# Patient Record
Sex: FEMALE | Race: OTHER | Employment: UNEMPLOYED | URBAN - NONMETROPOLITAN AREA
[De-identification: names, ages, dates, MRNs, and addresses within clinical notes are randomized per-mention and may not be internally consistent; named-entity substitution may affect disease eponyms.]

---

## 2018-05-19 ENCOUNTER — HOSPITAL ENCOUNTER (EMERGENCY)
Age: 48
Discharge: HOME | End: 2018-05-19
Attending: EMERGENCY MEDICINE
Payer: COMMERCIAL

## 2018-05-19 VITALS
HEART RATE: 87 BPM | SYSTOLIC BLOOD PRESSURE: 136 MMHG | WEIGHT: 160 LBS | TEMPERATURE: 98.9 F | BODY MASS INDEX: 30.21 KG/M2 | OXYGEN SATURATION: 94 % | RESPIRATION RATE: 16 BRPM | HEIGHT: 61 IN | DIASTOLIC BLOOD PRESSURE: 72 MMHG

## 2018-05-19 DIAGNOSIS — S61.412A LACERATION OF SKIN OF HAND, LEFT, INITIAL ENCOUNTER: Primary | ICD-10-CM

## 2018-05-19 PROCEDURE — 99282 EMERGENCY DEPT VISIT SF MDM: CPT

## 2018-05-19 PROCEDURE — 6360000002 HC RX W HCPCS: Performed by: EMERGENCY MEDICINE

## 2018-05-19 PROCEDURE — 90715 TDAP VACCINE 7 YRS/> IM: CPT | Performed by: EMERGENCY MEDICINE

## 2018-05-19 PROCEDURE — 90471 IMMUNIZATION ADMIN: CPT | Performed by: EMERGENCY MEDICINE

## 2018-05-19 PROCEDURE — 12001 RPR S/N/AX/GEN/TRNK 2.5CM/<: CPT

## 2018-05-19 PROCEDURE — 2500000003 HC RX 250 WO HCPCS: Performed by: EMERGENCY MEDICINE

## 2018-05-19 RX ORDER — LIDOCAINE HYDROCHLORIDE 10 MG/ML
5 INJECTION, SOLUTION INFILTRATION; PERINEURAL ONCE
Status: COMPLETED | OUTPATIENT
Start: 2018-05-19 | End: 2018-05-19

## 2018-05-19 RX ADMIN — TETANUS TOXOID, REDUCED DIPHTHERIA TOXOID AND ACELLULAR PERTUSSIS VACCINE, ADSORBED 0.5 ML: 5; 2.5; 8; 8; 2.5 SUSPENSION INTRAMUSCULAR at 18:07

## 2018-05-19 RX ADMIN — LIDOCAINE HYDROCHLORIDE 5 ML: 10 INJECTION, SOLUTION INFILTRATION; PERINEURAL at 18:06

## 2018-05-19 ASSESSMENT — PAIN DESCRIPTION - LOCATION: LOCATION: HAND

## 2018-05-19 ASSESSMENT — PAIN SCALES - GENERAL
PAINLEVEL_OUTOF10: 10
PAINLEVEL_OUTOF10: 10

## 2018-05-19 ASSESSMENT — PAIN DESCRIPTION - DESCRIPTORS: DESCRIPTORS: BURNING

## 2018-05-19 ASSESSMENT — PAIN DESCRIPTION - ORIENTATION: ORIENTATION: LEFT

## 2018-05-19 ASSESSMENT — PAIN DESCRIPTION - FREQUENCY: FREQUENCY: INTERMITTENT

## 2018-05-19 ASSESSMENT — PAIN DESCRIPTION - PAIN TYPE: TYPE: ACUTE PAIN

## 2018-09-05 ENCOUNTER — HOSPITAL ENCOUNTER (EMERGENCY)
Age: 48
Discharge: HOME | End: 2018-09-05
Attending: FAMILY MEDICINE
Payer: COMMERCIAL

## 2018-09-05 VITALS
SYSTOLIC BLOOD PRESSURE: 128 MMHG | HEIGHT: 67 IN | RESPIRATION RATE: 16 BRPM | DIASTOLIC BLOOD PRESSURE: 84 MMHG | OXYGEN SATURATION: 99 % | TEMPERATURE: 97.8 F | BODY MASS INDEX: 21.19 KG/M2 | HEART RATE: 70 BPM | WEIGHT: 135 LBS

## 2018-09-05 DIAGNOSIS — S01.01XA LACERATION OF SCALP, INITIAL ENCOUNTER: Primary | ICD-10-CM

## 2018-09-05 PROCEDURE — 2709999900 HC NON-CHARGEABLE SUPPLY

## 2018-09-05 PROCEDURE — 2500000003 HC RX 250 WO HCPCS: Performed by: FAMILY MEDICINE

## 2018-09-05 PROCEDURE — 90715 TDAP VACCINE 7 YRS/> IM: CPT | Performed by: FAMILY MEDICINE

## 2018-09-05 PROCEDURE — 90471 IMMUNIZATION ADMIN: CPT | Performed by: FAMILY MEDICINE

## 2018-09-05 PROCEDURE — 12001 RPR S/N/AX/GEN/TRNK 2.5CM/<: CPT

## 2018-09-05 PROCEDURE — 99282 EMERGENCY DEPT VISIT SF MDM: CPT

## 2018-09-05 PROCEDURE — 6360000002 HC RX W HCPCS: Performed by: FAMILY MEDICINE

## 2018-09-05 RX ORDER — SERTRALINE HYDROCHLORIDE 100 MG/1
TABLET, FILM COATED ORAL
COMMUNITY
Start: 2018-08-15

## 2018-09-05 RX ORDER — LIDOCAINE HYDROCHLORIDE AND EPINEPHRINE 10; 10 MG/ML; UG/ML
20 INJECTION, SOLUTION INFILTRATION; PERINEURAL ONCE
Status: COMPLETED | OUTPATIENT
Start: 2018-09-05 | End: 2018-09-05

## 2018-09-05 RX ADMIN — TETANUS TOXOID, REDUCED DIPHTHERIA TOXOID AND ACELLULAR PERTUSSIS VACCINE, ADSORBED 0.5 ML: 5; 2.5; 8; 8; 2.5 SUSPENSION INTRAMUSCULAR at 14:46

## 2018-09-05 RX ADMIN — LIDOCAINE HYDROCHLORIDE,EPINEPHRINE BITARTRATE 20 ML: 10; .01 INJECTION, SOLUTION INFILTRATION; PERINEURAL at 14:46

## 2018-09-05 ASSESSMENT — ENCOUNTER SYMPTOMS
RHINORRHEA: 0
ABDOMINAL PAIN: 0
BACK PAIN: 0
SORE THROAT: 0
EYE DISCHARGE: 0
SHORTNESS OF BREATH: 0
VOMITING: 0
EYE PAIN: 0
DIARRHEA: 0
NAUSEA: 0
COUGH: 0
WHEEZING: 0

## 2018-09-05 ASSESSMENT — PAIN SCALES - GENERAL: PAINLEVEL_OUTOF10: 0

## 2019-03-11 ENCOUNTER — HOSPITAL ENCOUNTER (OUTPATIENT)
Age: 49
End: 2019-03-11
Payer: COMMERCIAL

## 2019-03-11 ENCOUNTER — HOSPITAL ENCOUNTER (OUTPATIENT)
Dept: GENERAL RADIOLOGY | Age: 49
Discharge: HOME | End: 2019-03-11
Payer: COMMERCIAL

## 2019-03-11 DIAGNOSIS — R52 PAIN: ICD-10-CM

## 2019-03-11 PROCEDURE — 73560 X-RAY EXAM OF KNEE 1 OR 2: CPT

## 2020-03-24 ENCOUNTER — HOSPITAL ENCOUNTER (EMERGENCY)
Age: 50
Discharge: HOME | End: 2020-03-24
Attending: EMERGENCY MEDICINE
Payer: COMMERCIAL

## 2020-03-24 VITALS
SYSTOLIC BLOOD PRESSURE: 142 MMHG | BODY MASS INDEX: 35.34 KG/M2 | TEMPERATURE: 97.5 F | HEIGHT: 60 IN | RESPIRATION RATE: 17 BRPM | OXYGEN SATURATION: 98 % | DIASTOLIC BLOOD PRESSURE: 88 MMHG | WEIGHT: 180 LBS | HEART RATE: 88 BPM

## 2020-03-24 PROCEDURE — 99283 EMERGENCY DEPT VISIT LOW MDM: CPT

## 2020-03-24 RX ORDER — SULFAMETHOXAZOLE AND TRIMETHOPRIM 800; 160 MG/1; MG/1
1 TABLET ORAL 2 TIMES DAILY
Qty: 14 TABLET | Refills: 0 | Status: SHIPPED | OUTPATIENT
Start: 2020-03-24 | End: 2020-03-31

## 2020-03-24 RX ORDER — CEPHALEXIN 500 MG/1
500 CAPSULE ORAL 4 TIMES DAILY
Qty: 28 CAPSULE | Refills: 0 | Status: SHIPPED | OUTPATIENT
Start: 2020-03-24 | End: 2020-05-21 | Stop reason: ALTCHOICE

## 2020-03-24 RX ORDER — CEPHALEXIN 500 MG/1
500 CAPSULE ORAL ONCE
Status: DISCONTINUED | OUTPATIENT
Start: 2020-03-24 | End: 2020-03-24 | Stop reason: HOSPADM

## 2020-03-24 RX ORDER — MUPIROCIN CALCIUM 20 MG/G
CREAM TOPICAL
Qty: 1 TUBE | Refills: 0 | Status: SHIPPED | OUTPATIENT
Start: 2020-03-24 | End: 2020-04-23

## 2020-03-24 ASSESSMENT — PAIN SCALES - GENERAL
PAINLEVEL_OUTOF10: 10
PAINLEVEL_OUTOF10: 10

## 2020-03-24 ASSESSMENT — PAIN DESCRIPTION - LOCATION: LOCATION: BREAST

## 2020-03-24 ASSESSMENT — PAIN DESCRIPTION - ORIENTATION: ORIENTATION: RIGHT

## 2020-03-25 NOTE — ED PROVIDER NOTES
[address]  [address] [place] [address] [address]  Phone: [phone]    Emergency Department encounter      [address] [facility]    Chief Complaint   Patient presents with    Abscess     under right breast       HPI    Parish Zhao is a 52 y.o. female who presents noted complaint. Patient presents with abscess. She notes a spot underneath her right breast.  She has had prior abscesses in the past.  No other symptoms at this time such as fever or other problems. PAST MEDICAL HISTORY    Past Medical History:   Diagnosis Date    Depressive disorder, not elsewhere classified 4/22/2013       SURGICAL HISTORY    History reviewed. No pertinent surgical history. CURRENT MEDICATIONS    Current Outpatient Rx   Medication Sig Dispense Refill    cephALEXin (KEFLEX) 500 MG capsule Take 1 capsule by mouth 4 times daily 28 capsule 0    sulfamethoxazole-trimethoprim (BACTRIM DS) 800-160 MG per tablet Take 1 tablet by mouth 2 times daily for 7 days 14 tablet 0    mupirocin (BACTROBAN) 2 % cream Apply topically 3 times daily.  1 Tube 0    sertraline (ZOLOFT) 100 MG tablet          ALLERGIES    No Known Allergies    FAMILY HISTORY    Family History   Problem Relation Age of Onset    Cancer Father        SOCIAL HISTORY    Social History     Socioeconomic History    Marital status: Single     Spouse name: None    Number of children: 2    Years of education: 7    Highest education level: None   Occupational History    Occupation: unemployed   Social Needs    Financial resource strain: None    Food insecurity     Worry: None     Inability: None    Transportation needs     Medical: None     Non-medical: None   Tobacco Use    Smoking status: Current Every Day Smoker     Packs/day: 1.00     Types: Cigarettes    Smokeless tobacco: Never Used   Substance and Sexual Activity    Alcohol use: No     Alcohol/week: 2.0 standard drinks     Types: 2 Cans of beer per week    Drug use: No    Sexual further evaluation as an outpatient. Following up with primary care. Keflex will be gone here. Bactrim and Keflex and Bactroban as outpatient    SCREENINGS  BP (!) 142/88   Pulse 88   Temp 97.5 °F (36.4 °C)   Resp 17   Ht 5' (1.524 m)   Wt 180 lb (81.6 kg)   SpO2 98%   BMI 35.15 kg/m²      No orders to display       Screening For Hypertension and Follow-up (#317)  patient informed that blood pressure is abnormal and in the pre-hypertension range and should be rechecked by primary care      Screening For Tobacco Use and Cessation Intervention (#226):   reports that she has been smoking cigarettes. She has been smoking about 1.00 pack per day. She has never used smokeless tobacco.  Tobacco cessation encouraged with brief counseling. Written home care instructions for smoking cessation provided. FINAL IMPRESSION    1. Abscess    2. Nicotine abuse         PATIENT REFERRED TO:  Ludivina De Paz MD  [address] [place] [address]  [phone]    Call   For evaluation      DISCHARGE MEDICATIONS:  New Prescriptions    CEPHALEXIN (KEFLEX) 500 MG CAPSULE    Take 1 capsule by mouth 4 times daily    MUPIROCIN (BACTROBAN) 2 % CREAM    Apply topically 3 times daily.     SULFAMETHOXAZOLE-TRIMETHOPRIM (BACTRIM DS) 800-160 MG PER TABLET    Take 1 tablet by mouth 2 times daily for 7 days          Griselda Saha, MD  03/24/20 6470

## 2020-03-25 NOTE — ED NOTES
Discharge teaching and instructions for condition explained to patient. AVS reviewed. Printed prescriptions given to patient. Patient voiced understanding regarding prescriptions, follow up appointments and care of self at home. Pt discharged to home in stable condition per self.        Aurelio Leon, RN  03/24/20 6752

## 2020-05-21 ENCOUNTER — HOSPITAL ENCOUNTER (EMERGENCY)
Age: 50
Discharge: HOME | End: 2020-05-21
Attending: EMERGENCY MEDICINE
Payer: COMMERCIAL

## 2020-05-21 ENCOUNTER — APPOINTMENT (OUTPATIENT)
Dept: GENERAL RADIOLOGY | Age: 50
End: 2020-05-21
Payer: COMMERCIAL

## 2020-05-21 VITALS
OXYGEN SATURATION: 98 % | HEIGHT: 64 IN | SYSTOLIC BLOOD PRESSURE: 131 MMHG | HEART RATE: 100 BPM | WEIGHT: 180 LBS | RESPIRATION RATE: 18 BRPM | DIASTOLIC BLOOD PRESSURE: 72 MMHG | BODY MASS INDEX: 30.73 KG/M2 | TEMPERATURE: 97.5 F

## 2020-05-21 PROCEDURE — 6370000000 HC RX 637 (ALT 250 FOR IP): Performed by: EMERGENCY MEDICINE

## 2020-05-21 PROCEDURE — 99283 EMERGENCY DEPT VISIT LOW MDM: CPT

## 2020-05-21 PROCEDURE — 73564 X-RAY EXAM KNEE 4 OR MORE: CPT

## 2020-05-21 PROCEDURE — L1830 KO IMMOB CANVAS LONG PRE OTS: HCPCS

## 2020-05-21 RX ORDER — IBUPROFEN 200 MG
600 TABLET ORAL ONCE
Status: COMPLETED | OUTPATIENT
Start: 2020-05-21 | End: 2020-05-21

## 2020-05-21 RX ORDER — ACETAMINOPHEN 325 MG/1
650 TABLET ORAL EVERY 6 HOURS PRN
COMMUNITY

## 2020-05-21 RX ADMIN — IBUPROFEN 600 MG: 200 TABLET, FILM COATED ORAL at 22:10

## 2020-05-21 ASSESSMENT — PAIN SCALES - GENERAL
PAINLEVEL_OUTOF10: 10
PAINLEVEL_OUTOF10: 10

## 2020-05-22 NOTE — ED PROVIDER NOTES
[address]  [address] [place] [address] [address]  Phone: [phone]    Emergency Department encounter      279 [facility]    Chief Complaint   Patient presents with    Knee Pain     right       HPI    Jim Cramer is a 52 y.o. female who presents above-noted complaint. Patient states she fell down 2 steps hitting her right knee. Landing on her right knee. Complains of pain and discomfort to that area. Denies syncope spinal injury back pain chest pain extremity pain other than her knee and is otherwise been doing fine.     PAST MEDICAL HISTORY    Past Medical History:   Diagnosis Date    Depressive disorder, not elsewhere classified 4/22/2013       SURGICAL HISTORY    Past Surgical History:   Procedure Laterality Date    FOOT SURGERY Left     KNEE SURGERY Right        CURRENT MEDICATIONS    Current Outpatient Rx   Medication Sig Dispense Refill    acetaminophen (TYLENOL) 325 MG tablet Take 650 mg by mouth every 6 hours as needed for Pain      sertraline (ZOLOFT) 100 MG tablet          ALLERGIES    No Known Allergies    FAMILY HISTORY    Family History   Problem Relation Age of Onset    Cancer Father        SOCIAL HISTORY    Social History     Socioeconomic History    Marital status: Single     Spouse name: None    Number of children: 2    Years of education: 7    Highest education level: None   Occupational History    Occupation: unemployed   Social Needs    Financial resource strain: None    Food insecurity     Worry: None     Inability: None    Transportation needs     Medical: None     Non-medical: None   Tobacco Use    Smoking status: Current Every Day Smoker     Packs/day: 1.00     Types: Cigarettes    Smokeless tobacco: Never Used   Substance and Sexual Activity    Alcohol use: No     Alcohol/week: 2.0 standard drinks     Types: 2 Cans of beer per week    Drug use: No    Sexual activity: Not Currently   Lifestyle    Physical activity     Days per week: None Minutes per session: None    Stress: None   Relationships    Social connections     Talks on phone: None     Gets together: None     Attends [employer] service: None     Active member of club or organization: None     Attends meetings of clubs or organizations: None     Relationship status: None    Intimate partner violence     Fear of current or ex partner: None     Emotionally abused: None     Physically abused: None     Forced sexual activity: None   Other Topics Concern    None   Social History Narrative    Lives with 23 y/o daughter. Has a 15 y/o that lives with her niece. REVIEW OF SYSTEMS    As for knee injury. No weakness numbness or tingling. No other trauma or injury. All systems negative except as marked. PHYSICAL EXAM    VITAL SIGNS: /72   Pulse 100   Temp 97.5 °F (36.4 °C) (Oral)   Resp 18   Ht 5' 4\" (1.626 m)   Wt 180 lb (81.6 kg)   SpO2 98%   BMI 30.90 kg/m²    Constitutional:  Alert not toxtic or ill, able to give coherent history*  HENT:  Normocephalic, Atraumatic  Cervical Spine: Normal range of motion,  No stridor. Eyes:  No discharge or  Swelling  Respiratory: No respiratory distress  Musculoskeletal:  Intact distal pulses,   Pain at old scar to the right medial knee. Maybe a small bruise to the anterior patella area without step-off. No crepitus. Drawer signs are negative. Integument:  Warm, Dry, No erythema, No rash (on exposed areas)   Neurologic:  Alert & appropriate   Psychiatric:  Affect normal    EKG                      RADIOLOGY    XR KNEE RIGHT (MIN 4 VIEWS)   Final Result    Normal knee. **This report has been created using voice recognition software. It may contain minor errors which are inherent in voice recognition technology. **      Final report electronically signed by Dr. Marc Marrero on 5/21/2020 10:29 PM          PROCEDURES    none      CONSULTS:  None        ED COURSE & MEDICAL DECISION MAKING    Pertinent Labs & Imaging studies reviewed. (See chart for details)  She has right knee pain and discomfort after fall. Neurovascular exam is otherwise normal.  No other spinal injury or other trauma or other problems. Checking plain film to rule out fracture or other bony injury. REASSESSMENT  Patient rechecked and updated on lab/xray status, progress and results. .  Patient was reassessed and condition was unchanged after tx. No further needs at this time. Is a negative at this time. Recommend a Velcro splint for comfort    SCREENINGS  /72   Pulse 100   Temp 97.5 °F (36.4 °C) (Oral)   Resp 18   Ht 5' 4\" (1.626 m)   Wt 180 lb (81.6 kg)   SpO2 98%   BMI 30.90 kg/m²      XR KNEE RIGHT (MIN 4 VIEWS)   Final Result    Normal knee. **This report has been created using voice recognition software. It may contain minor errors which are inherent in voice recognition technology. **      Final report electronically signed by Dr. Zainab Walker on 5/21/2020 10:29 PM          Screening For Hypertension and Follow-up (#317)  patient informed that blood pressure is abnormal and in the pre-hypertension range and should be rechecked by primary care      Screening For Tobacco Use and Cessation Intervention (#226):   reports that she has been smoking cigarettes. She has been smoking about 1.00 pack per day. She has never used smokeless tobacco.  Tobacco cessation encouraged with brief counseling. Written home care instructions for smoking cessation provided. FINAL IMPRESSION    1. Contusion of right knee, initial encounter    2.  Nicotine abuse         PATIENT REFERRED TO:  Roxana Velasquez MD  [address] [place] [address]  [phone]    Call   For evaluation      DISCHARGE MEDICATIONS:  New Prescriptions    No medications on file           Lizzie Zavaleta MD  05/22/20 3380

## 2020-05-22 NOTE — ED NOTES
Discharged home in stable condition. AVS discussed/reviewed with patient. Patient verbalized understanding of all instructions given; no questions/concerns voiced. Respirations easy, regular and non-labored; no distress noted. Skin color normal for ethnicity, warm and dry; mucous membranes moist. Alert and appropriate for age. Transported via wheelchair to private vehicle.      Ben Ham RN  05/21/20 6138

## 2020-05-22 NOTE — ED TRIAGE NOTES
Patient presents per private vehicle. Wheelchair taken outside to assist patient into the building. Patient c/o right knee pain s/p falling down five steps. States she was carrying her laundry basket and must have missed a step. Denies hitting her head or any loss of consciousness. Right knee noted to be slightly swollen and ecchymotic. States she did ice it when it happened. States this happened about 2100. Also reports that she took Tylenol 325 mg x 4 tablets. Patient given proper dosing for Tylenol. Right pedal pulse strong to palpation. Respirations easy, regular and non-labored; no distress noted. Skin color normal for ethnicity, warm and dry; mucous membranes moist. Alert and appropriate for age. Taken by wheelchair to exam room 5 for triage. Patient remains in wheelchair. Dr. Jessica Mathew made aware of patient.

## 2020-06-13 ENCOUNTER — HOSPITAL ENCOUNTER (OUTPATIENT)
Age: 50
Setting detail: OBSERVATION
Discharge: LEFT BEFORE DISCHARGE | End: 2020-06-14
Attending: FAMILY MEDICINE | Admitting: FAMILY MEDICINE
Payer: COMMERCIAL

## 2020-06-13 PROBLEM — R07.9 CHEST PAIN: Status: ACTIVE | Noted: 2020-06-13

## 2020-06-13 PROCEDURE — G0378 HOSPITAL OBSERVATION PER HR: HCPCS

## 2020-06-13 PROCEDURE — G0379 DIRECT REFER HOSPITAL OBSERV: HCPCS

## 2020-06-13 PROCEDURE — 99219 PR INITIAL OBSERVATION CARE/DAY 50 MINUTES: CPT | Performed by: FAMILY MEDICINE

## 2020-06-13 RX ORDER — ACETAMINOPHEN 325 MG/1
650 TABLET ORAL EVERY 6 HOURS PRN
Status: DISCONTINUED | OUTPATIENT
Start: 2020-06-13 | End: 2020-06-14 | Stop reason: HOSPADM

## 2020-06-13 RX ORDER — PROMETHAZINE HYDROCHLORIDE 25 MG/1
12.5 TABLET ORAL EVERY 6 HOURS PRN
Status: DISCONTINUED | OUTPATIENT
Start: 2020-06-13 | End: 2020-06-14 | Stop reason: HOSPADM

## 2020-06-13 RX ORDER — ASPIRIN 81 MG/1
81 TABLET, CHEWABLE ORAL DAILY
Status: DISCONTINUED | OUTPATIENT
Start: 2020-06-14 | End: 2020-06-14

## 2020-06-13 RX ORDER — HEPARIN SODIUM 5000 [USP'U]/ML
5000 INJECTION, SOLUTION INTRAVENOUS; SUBCUTANEOUS EVERY 8 HOURS SCHEDULED
Status: DISCONTINUED | OUTPATIENT
Start: 2020-06-14 | End: 2020-06-14 | Stop reason: HOSPADM

## 2020-06-13 RX ORDER — ONDANSETRON 2 MG/ML
4 INJECTION INTRAMUSCULAR; INTRAVENOUS EVERY 6 HOURS PRN
Status: DISCONTINUED | OUTPATIENT
Start: 2020-06-13 | End: 2020-06-14 | Stop reason: HOSPADM

## 2020-06-13 RX ORDER — SODIUM CHLORIDE 0.9 % (FLUSH) 0.9 %
10 SYRINGE (ML) INJECTION EVERY 12 HOURS SCHEDULED
Status: DISCONTINUED | OUTPATIENT
Start: 2020-06-14 | End: 2020-06-14 | Stop reason: HOSPADM

## 2020-06-13 RX ORDER — SODIUM CHLORIDE 0.9 % (FLUSH) 0.9 %
10 SYRINGE (ML) INJECTION PRN
Status: DISCONTINUED | OUTPATIENT
Start: 2020-06-13 | End: 2020-06-14 | Stop reason: HOSPADM

## 2020-06-13 RX ORDER — ACETAMINOPHEN 650 MG/1
650 SUPPOSITORY RECTAL EVERY 6 HOURS PRN
Status: DISCONTINUED | OUTPATIENT
Start: 2020-06-13 | End: 2020-06-14 | Stop reason: HOSPADM

## 2020-06-13 RX ORDER — POLYETHYLENE GLYCOL 3350 17 G/17G
17 POWDER, FOR SOLUTION ORAL DAILY PRN
Status: DISCONTINUED | OUTPATIENT
Start: 2020-06-13 | End: 2020-06-14 | Stop reason: HOSPADM

## 2020-06-13 RX ORDER — SERTRALINE HYDROCHLORIDE 100 MG/1
100 TABLET, FILM COATED ORAL DAILY
Status: DISCONTINUED | OUTPATIENT
Start: 2020-06-14 | End: 2020-06-14 | Stop reason: HOSPADM

## 2020-06-13 RX ORDER — NITROGLYCERIN 0.4 MG/1
0.4 TABLET SUBLINGUAL EVERY 5 MIN PRN
Status: DISCONTINUED | OUTPATIENT
Start: 2020-06-13 | End: 2020-06-14 | Stop reason: HOSPADM

## 2020-06-13 ASSESSMENT — PAIN SCALES - GENERAL: PAINLEVEL_OUTOF10: 0

## 2020-06-14 VITALS
WEIGHT: 182.3 LBS | DIASTOLIC BLOOD PRESSURE: 77 MMHG | HEART RATE: 76 BPM | BODY MASS INDEX: 32.3 KG/M2 | HEIGHT: 63 IN | SYSTOLIC BLOOD PRESSURE: 124 MMHG | TEMPERATURE: 97.8 F | OXYGEN SATURATION: 95 % | RESPIRATION RATE: 18 BRPM

## 2020-06-14 LAB
ALBUMIN SERPL-MCNC: 3.4 G/DL (ref 3.5–5.1)
ALP BLD-CCNC: 56 U/L (ref 38–126)
ALT SERPL-CCNC: 16 U/L (ref 11–66)
ANION GAP SERPL CALCULATED.3IONS-SCNC: 10 MEQ/L (ref 8–16)
APTT: 33.4 SECONDS (ref 22–38)
AST SERPL-CCNC: 16 U/L (ref 5–40)
AVERAGE GLUCOSE: 96 MG/DL (ref 70–126)
BASOPHILS # BLD: 0.4 %
BASOPHILS ABSOLUTE: 0 THOU/MM3 (ref 0–0.1)
BILIRUB SERPL-MCNC: 0.3 MG/DL (ref 0.3–1.2)
BUN BLDV-MCNC: 6 MG/DL (ref 7–22)
CALCIUM SERPL-MCNC: 9.3 MG/DL (ref 8.5–10.5)
CHLORIDE BLD-SCNC: 100 MEQ/L (ref 98–111)
CHOLESTEROL, TOTAL: 177 MG/DL (ref 100–199)
CO2: 25 MEQ/L (ref 23–33)
CREAT SERPL-MCNC: 0.5 MG/DL (ref 0.4–1.2)
EKG ATRIAL RATE: 83 BPM
EKG P AXIS: 51 DEGREES
EKG P-R INTERVAL: 164 MS
EKG Q-T INTERVAL: 374 MS
EKG QRS DURATION: 80 MS
EKG QTC CALCULATION (BAZETT): 439 MS
EKG R AXIS: 36 DEGREES
EKG T AXIS: 34 DEGREES
EKG VENTRICULAR RATE: 83 BPM
EOSINOPHIL # BLD: 4.4 %
EOSINOPHILS ABSOLUTE: 0.4 THOU/MM3 (ref 0–0.4)
ERYTHROCYTE [DISTWIDTH] IN BLOOD BY AUTOMATED COUNT: 13.2 % (ref 11.5–14.5)
ERYTHROCYTE [DISTWIDTH] IN BLOOD BY AUTOMATED COUNT: 42.9 FL (ref 35–45)
GFR SERPL CREATININE-BSD FRML MDRD: > 90 ML/MIN/1.73M2
GLUCOSE BLD-MCNC: 103 MG/DL (ref 70–108)
HBA1C MFR BLD: 5.2 % (ref 4.4–6.4)
HCT VFR BLD CALC: 42.6 % (ref 37–47)
HDLC SERPL-MCNC: 41 MG/DL
HEMOGLOBIN: 14.2 GM/DL (ref 12–16)
IMMATURE GRANS (ABS): 0.02 THOU/MM3 (ref 0–0.07)
IMMATURE GRANULOCYTES: 0.2 %
INR BLD: 0.99 (ref 0.85–1.13)
LDL CHOLESTEROL CALCULATED: 114 MG/DL
LYMPHOCYTES # BLD: 35.5 %
LYMPHOCYTES ABSOLUTE: 3 THOU/MM3 (ref 1–4.8)
MAGNESIUM: 1.8 MG/DL (ref 1.6–2.4)
MCH RBC QN AUTO: 29.6 PG (ref 26–33)
MCHC RBC AUTO-ENTMCNC: 33.3 GM/DL (ref 32.2–35.5)
MCV RBC AUTO: 88.9 FL (ref 81–99)
MONOCYTES # BLD: 7.5 %
MONOCYTES ABSOLUTE: 0.6 THOU/MM3 (ref 0.4–1.3)
NUCLEATED RED BLOOD CELLS: 0 /100 WBC
PHOSPHORUS: 4.1 MG/DL (ref 2.4–4.7)
PLATELET # BLD: 211 THOU/MM3 (ref 130–400)
PMV BLD AUTO: 9.1 FL (ref 9.4–12.4)
POTASSIUM REFLEX MAGNESIUM: 3.7 MEQ/L (ref 3.5–5.2)
RBC # BLD: 4.79 MILL/MM3 (ref 4.2–5.4)
SEG NEUTROPHILS: 52 %
SEGMENTED NEUTROPHILS ABSOLUTE COUNT: 4.4 THOU/MM3 (ref 1.8–7.7)
SODIUM BLD-SCNC: 135 MEQ/L (ref 135–145)
TOTAL PROTEIN: 7 G/DL (ref 6.1–8)
TRIGL SERPL-MCNC: 112 MG/DL (ref 0–199)
TROPONIN T: < 0.01 NG/ML
TROPONIN T: < 0.01 NG/ML
TSH SERPL DL<=0.05 MIU/L-ACNC: 4.18 UIU/ML (ref 0.4–4.2)
WBC # BLD: 8.5 THOU/MM3 (ref 4.8–10.8)

## 2020-06-14 PROCEDURE — 6370000000 HC RX 637 (ALT 250 FOR IP): Performed by: FAMILY MEDICINE

## 2020-06-14 PROCEDURE — 85025 COMPLETE CBC W/AUTO DIFF WBC: CPT

## 2020-06-14 PROCEDURE — 85610 PROTHROMBIN TIME: CPT

## 2020-06-14 PROCEDURE — 6360000002 HC RX W HCPCS: Performed by: FAMILY MEDICINE

## 2020-06-14 PROCEDURE — 84443 ASSAY THYROID STIM HORMONE: CPT

## 2020-06-14 PROCEDURE — 2580000003 HC RX 258: Performed by: INTERNAL MEDICINE

## 2020-06-14 PROCEDURE — 93005 ELECTROCARDIOGRAM TRACING: CPT | Performed by: FAMILY MEDICINE

## 2020-06-14 PROCEDURE — 84484 ASSAY OF TROPONIN QUANT: CPT

## 2020-06-14 PROCEDURE — 96372 THER/PROPH/DIAG INJ SC/IM: CPT

## 2020-06-14 PROCEDURE — 85730 THROMBOPLASTIN TIME PARTIAL: CPT

## 2020-06-14 PROCEDURE — 36415 COLL VENOUS BLD VENIPUNCTURE: CPT

## 2020-06-14 PROCEDURE — 83735 ASSAY OF MAGNESIUM: CPT

## 2020-06-14 PROCEDURE — 99217 PR OBSERVATION CARE DISCHARGE MANAGEMENT: CPT | Performed by: INTERNAL MEDICINE

## 2020-06-14 PROCEDURE — G0378 HOSPITAL OBSERVATION PER HR: HCPCS

## 2020-06-14 PROCEDURE — 80061 LIPID PANEL: CPT

## 2020-06-14 PROCEDURE — 2580000003 HC RX 258: Performed by: FAMILY MEDICINE

## 2020-06-14 PROCEDURE — 83036 HEMOGLOBIN GLYCOSYLATED A1C: CPT

## 2020-06-14 PROCEDURE — 94760 N-INVAS EAR/PLS OXIMETRY 1: CPT

## 2020-06-14 PROCEDURE — 80053 COMPREHEN METABOLIC PANEL: CPT

## 2020-06-14 PROCEDURE — 84100 ASSAY OF PHOSPHORUS: CPT

## 2020-06-14 RX ORDER — ATORVASTATIN CALCIUM 40 MG/1
40 TABLET, FILM COATED ORAL NIGHTLY
Status: DISCONTINUED | OUTPATIENT
Start: 2020-06-14 | End: 2020-06-14 | Stop reason: HOSPADM

## 2020-06-14 RX ORDER — SODIUM CHLORIDE 9 MG/ML
INJECTION, SOLUTION INTRAVENOUS CONTINUOUS
Status: DISCONTINUED | OUTPATIENT
Start: 2020-06-14 | End: 2020-06-14 | Stop reason: HOSPADM

## 2020-06-14 RX ORDER — ASPIRIN 81 MG/1
81 TABLET, CHEWABLE ORAL DAILY
Status: DISCONTINUED | OUTPATIENT
Start: 2020-06-15 | End: 2020-06-14 | Stop reason: HOSPADM

## 2020-06-14 RX ORDER — ASPIRIN 81 MG/1
324 TABLET, CHEWABLE ORAL ONCE
Status: COMPLETED | OUTPATIENT
Start: 2020-06-14 | End: 2020-06-14

## 2020-06-14 RX ORDER — ASPIRIN 81 MG/1
81 TABLET, CHEWABLE ORAL DAILY
Qty: 30 TABLET | Refills: 0 | Status: SHIPPED | OUTPATIENT
Start: 2020-06-15

## 2020-06-14 RX ORDER — ATORVASTATIN CALCIUM 40 MG/1
40 TABLET, FILM COATED ORAL NIGHTLY
Qty: 30 TABLET | Refills: 0 | Status: SHIPPED | OUTPATIENT
Start: 2020-06-14

## 2020-06-14 RX ADMIN — SERTRALINE 100 MG: 100 TABLET, FILM COATED ORAL at 10:26

## 2020-06-14 RX ADMIN — ASPIRIN 81 MG 324 MG: 81 TABLET ORAL at 10:25

## 2020-06-14 RX ADMIN — SODIUM CHLORIDE: 9 INJECTION, SOLUTION INTRAVENOUS at 10:25

## 2020-06-14 RX ADMIN — Medication 10 ML: at 10:26

## 2020-06-14 RX ADMIN — HEPARIN SODIUM 5000 UNITS: 5000 INJECTION INTRAVENOUS; SUBCUTANEOUS at 06:31

## 2020-06-14 ASSESSMENT — PAIN SCALES - GENERAL
PAINLEVEL_OUTOF10: 0
PAINLEVEL_OUTOF10: 0

## 2020-06-14 NOTE — PROGRESS NOTES
Pt signed out AMA, Educated on discharge instructions, medications, and follow up appointments. Discharge education provided in [address] and [address] No further questions or concerns. Discharged to home with children.

## 2020-06-14 NOTE — DISCHARGE SUMMARY
1015   BP:  125/65  124/77   Pulse:  79  76   Resp:  18  18   Temp:  97.6 °F (36.4 °C)  97.8 °F (36.6 °C)   TempSrc:  Oral  Oral   SpO2:  96% 95% 95%   Weight: 182 lb 4.8 oz (82.7 kg)      Height: 5' 3\" (1.6 m)        Weight: Weight: 182 lb 4.8 oz (82.7 kg)     24 hour intake/output:No intake or output data in the 24 hours ending 06/14/20 1752      General appearance: No apparent distress, well developed, appears stated age. Eyes:  Pupils equal, round, and reactive to light. Conjunctivae/corneas clear. HENT: Head normal in appearance. External nares normal.  Oral mucosa moist without lesions. Hearing grossly intact. Neck: Supple, with full range of motion. No jugular venous distention. Trachea midline. Respiratory:  Normal respiratory effort. Clear to auscultation, bilaterally without rales or wheezes or rhonchi. Cardiovascular: Normal rate, regular rhythm with normal S1/S2 without murmurs. No lower extremity edema. Abdomen: Soft, non-tender, non-distended with normal bowel sounds. Musculoskeletal: Normal range of motion in extremities. There is no joint swelling or tenderness. Skin: Skin color, texture, turgor normal.  No rashes or lesions. Neurologic:  Neurovascularly intact without any focal sensory/motor deficits in the upper and lower extremities. Cranial nerves:  grossly non-focal.  Psychiatric: Alert and oriented, thought content appropriate, normal insight. Labs:  For convenience and continuity at follow-up the following most recent labs are provided:      CBC:    Lab Results   Component Value Date    WBC 8.5 06/14/2020    HGB 14.2 06/14/2020    HCT 42.6 06/14/2020     06/14/2020       Renal:    Lab Results   Component Value Date     06/14/2020    K 3.7 06/14/2020     06/14/2020    CO2 25 06/14/2020    BUN 6 06/14/2020    CREATININE 0.5 06/14/2020    CALCIUM 9.3 06/14/2020    PHOS 4.1 06/14/2020         Significant Diagnostic Studies    Radiology:   No orders to display

## 2020-06-14 NOTE — PROGRESS NOTES
Patient is a 48year old from [facility] with Dr Coleen Berry transferring. Patient presented with c/o sub-sternal chest pain for months but worsening today with associated nausea, vomiting and SOB. Patient without cardiac history but is a smoker. Patient is Barb[place] speaking. Chest pain resolved with ntg. Troponin negative x 1. EKG NSR without ST changes. /85, HR 69, SPO2 98% room air. Afebrile. CXR normal. D-Dimer WNL. No gtts started. Patient will go to [phone] as an observation CP under Dr Julio Alexis.

## 2020-06-14 NOTE — H&P
of the patient's chest without radiation / no associated nausea/emesis or shortness of breath (all reported at [facility]) / no diaphoresis or paresthesias / chest pain remained constant following initial onset, but did worsen with exertion and improve slightly with rest. Patient denies experiencing chest pain like this in the past. She is a chronic PPD smoker. Denies personal history of CAD/MI, HTN, HLD or DM. Denies family history of early CAD/MI. She takes zoloft daily for hx depression, but does not take any other daily medications. Of note -- she did receive nitro at [facility], which resolved her chest pain completely. Denies recent illness or sick contacts. No reported fevers/chills, headaches, cough, wheezing, abdominal pain, diarrhea/constipation, urinary symptoms, leg swelling or syncope. [place] ED course: afebrile / VSS on arrival (/85 / HR 69 / SpO2 98% on RA). Trop negative x 1. EKG: NSR without ST changes. D-Dimer nml. Symptoms relieved following nitro administration. No additional therapies started. Patient was transferred to [facility] for additional workup and management. She remained hemodynamically stable and asymptomatic of CP upon arrival to [facility]. No additional questions / concerns identified at this time. Please see A/P for additional details. Past Medical History:    Chronic tobacco abuse  Obesity  Depression    Past Surgical History:    Procedure Laterality Date    FOOT SURGERY Left     KNEE SURGERY Right       Medications Prior to Admission:   Medication Sig    acetaminophen (TYLENOL) 325 MG tablet Take 650 mg by mouth every 6 hours as needed for Pain    sertraline (ZOLOFT) 100 MG tablet      Allergies:   Patient has no known allergies.     Social History:   Socioeconomic History    Marital status: Single    Number of children: 2   Tobacco Use    Smoking status: Current Every Day Smoker     Packs/day: 1.00     Types: Cigarettes    Smokeless tobacco: Never Used

## 2020-06-15 PROCEDURE — 93010 ELECTROCARDIOGRAM REPORT: CPT | Performed by: INTERNAL MEDICINE

## 2022-05-27 ENCOUNTER — HOSPITAL ENCOUNTER (EMERGENCY)
Age: 52
Discharge: HOME | End: 2022-05-27
Attending: EMERGENCY MEDICINE
Payer: COMMERCIAL

## 2022-05-27 VITALS
HEIGHT: 61 IN | WEIGHT: 180 LBS | HEART RATE: 87 BPM | TEMPERATURE: 97.1 F | RESPIRATION RATE: 16 BRPM | DIASTOLIC BLOOD PRESSURE: 74 MMHG | BODY MASS INDEX: 33.99 KG/M2 | OXYGEN SATURATION: 99 % | SYSTOLIC BLOOD PRESSURE: 131 MMHG

## 2022-05-27 DIAGNOSIS — M54.10 RADICULAR PAIN OF LEFT LOWER EXTREMITY: Primary | ICD-10-CM

## 2022-05-27 DIAGNOSIS — M54.32 SCIATICA, LEFT SIDE: ICD-10-CM

## 2022-05-27 PROCEDURE — 96372 THER/PROPH/DIAG INJ SC/IM: CPT

## 2022-05-27 PROCEDURE — 6370000000 HC RX 637 (ALT 250 FOR IP): Performed by: EMERGENCY MEDICINE

## 2022-05-27 PROCEDURE — 6360000002 HC RX W HCPCS: Performed by: EMERGENCY MEDICINE

## 2022-05-27 PROCEDURE — 99284 EMERGENCY DEPT VISIT MOD MDM: CPT

## 2022-05-27 RX ORDER — IBUPROFEN 800 MG/1
800 TABLET ORAL ONCE
Status: COMPLETED | OUTPATIENT
Start: 2022-05-27 | End: 2022-05-27

## 2022-05-27 RX ORDER — KETOROLAC TROMETHAMINE 30 MG/ML
30 INJECTION, SOLUTION INTRAMUSCULAR; INTRAVENOUS ONCE
Status: COMPLETED | OUTPATIENT
Start: 2022-05-27 | End: 2022-05-27

## 2022-05-27 RX ADMIN — HYDROMORPHONE HYDROCHLORIDE 0.5 MG: 1 INJECTION, SOLUTION INTRAMUSCULAR; INTRAVENOUS; SUBCUTANEOUS at 19:44

## 2022-05-27 RX ADMIN — IBUPROFEN 800 MG: 800 TABLET, FILM COATED ORAL at 19:45

## 2022-05-27 RX ADMIN — KETOROLAC TROMETHAMINE 30 MG: 30 INJECTION, SOLUTION INTRAMUSCULAR at 19:42

## 2022-05-27 ASSESSMENT — ENCOUNTER SYMPTOMS
WHEEZING: 0
DIARRHEA: 0
EYE PAIN: 0
ABDOMINAL PAIN: 0
SHORTNESS OF BREATH: 0
BLOOD IN STOOL: 0
BACK PAIN: 0
SORE THROAT: 0
EYE DISCHARGE: 0

## 2022-05-27 ASSESSMENT — PAIN - FUNCTIONAL ASSESSMENT
PAIN_FUNCTIONAL_ASSESSMENT: NONE - DENIES PAIN
PAIN_FUNCTIONAL_ASSESSMENT: 0-10

## 2022-05-27 ASSESSMENT — PAIN DESCRIPTION - ORIENTATION: ORIENTATION: LEFT

## 2022-05-27 ASSESSMENT — PAIN DESCRIPTION - FREQUENCY: FREQUENCY: INTERMITTENT

## 2022-05-27 ASSESSMENT — PAIN DESCRIPTION - LOCATION: LOCATION: LEG

## 2022-05-27 ASSESSMENT — PAIN DESCRIPTION - PAIN TYPE: TYPE: ACUTE PAIN

## 2022-05-27 ASSESSMENT — PAIN DESCRIPTION - DESCRIPTORS: DESCRIPTORS: BURNING;CRAMPING

## 2022-05-27 ASSESSMENT — PAIN SCALES - GENERAL: PAINLEVEL_OUTOF10: 10

## 2022-05-27 NOTE — ED TRIAGE NOTES
Patient arrival to the ER with complaints of left leg pain started this morning rating it a 10/10. Patient denies taking anything for pain today. States she woke up with it. Patient describes it as a burning and cramping feeling. Patient is aler and oriented x4 breathing with ease.

## 2022-05-27 NOTE — ED PROVIDER NOTES
[address]  [address] [facility] [address]  Phone: [phone]    eMERGENCY dEPARTMENT eNCOUnter           [address] [facility]       Chief Complaint   Patient presents with    Leg Pain     left leg        Nurses Notes reviewed and I agree except as noted in the HPI. HISTORY OF PRESENT ILLNESS    Tina Armendariz is a 46 y.o. female who presented via private vehicle with above-mentioned complaints. She said that she woke up this morning with left leg pain. She describes her pain as mild, sharp, shooting pain started in her left buttock and radiates down to her left foot. She denies fall or injury. She has not been lifting heavy weights recently. She denies bladder or bowel dysfunction. She denies leg weakness or numbness. She denies saddle paresthesia. She has no history of chronic back pain. He denies fall or injury. REVIEW OF SYSTEMS     Review of Systems   Constitutional: Negative for chills and fever. HENT: Negative for sore throat. Eyes: Negative for pain and discharge. Respiratory: Negative for shortness of breath and wheezing. Cardiovascular: Negative for chest pain and palpitations. Gastrointestinal: Negative for abdominal pain, blood in stool and diarrhea. Genitourinary: Negative for dysuria and hematuria. Musculoskeletal: Negative for back pain, neck pain and neck stiffness. Left leg pain as mentioned above   Neurological: Negative for seizures, syncope and headaches. Hematological: Negative for adenopathy. Psychiatric/Behavioral: Negative for confusion. PAST MEDICAL HISTORY    has a past medical history of Depressive disorder, not elsewhere classified. SURGICAL HISTORY      has a past surgical history that includes knee surgery (Right) and Foot surgery (Left).     CURRENT MEDICATIONS       Previous Medications    ACETAMINOPHEN (TYLENOL) 325 MG TABLET    Take 650 mg by mouth every 6 hours as needed for Pain    ASPIRIN 81 MG CHEWABLE TABLET    Take 1 tablet by mouth daily    ATORVASTATIN (LIPITOR) 40 MG TABLET    Take 1 tablet by mouth nightly    SERTRALINE (ZOLOFT) 100 MG TABLET           ALLERGIES     has No Known Allergies. FAMILY HISTORY     She indicated that the status of her father is unknown.   family history includes Cancer in her father. SOCIAL HISTORY      reports that she has been smoking cigarettes. She has been smoking about 1.00 pack per day. She has never used smokeless tobacco. She reports that she does not drink alcohol and does not use drugs. PHYSICAL EXAM     INITIAL VITALS:  height is 5' 1\" (1.549 m) and weight is 180 lb (81.6 kg). Her temporal temperature is 97.1 °F (36.2 °C). Her blood pressure is 131/74 and her pulse is 87. Her respiration is 16 and oxygen saturation is 99%. Physical Exam  Vitals and nursing note reviewed. Constitutional:       General: She is not in acute distress. Appearance: She is well-developed. She is not ill-appearing. HENT:      Head: Atraumatic. Neck:      Thyroid: No thyromegaly. Vascular: No JVD. Trachea: No tracheal deviation. Cardiovascular:      Rate and Rhythm: Normal rate and regular rhythm. Pulses: Normal pulses. Heart sounds: No murmur heard. No friction rub. No gallop. Pulmonary:      Effort: Pulmonary effort is normal.      Breath sounds: Normal breath sounds. Abdominal:      General: Bowel sounds are normal.      Palpations: Abdomen is soft. Tenderness: There is no abdominal tenderness. Musculoskeletal:      Cervical back: Neck supple. Comments: Back examination showed no abnormality. There is no midline tenderness. Neurological:      Mental Status: She is alert. Motor: No weakness. Deep Tendon Reflexes: Reflexes normal.      Comments: She has normal motor and sensory examination of both lower extremities.            DIFFERENTIAL DIAGNOSIS:       DIAGNOSTIC RESULTS         LABS:   Labs Reviewed - No data to display    EMERGENCY DEPARTMENT COURSE:   Vitals:    Vitals:    05/27/22 1831   BP: 131/74   Pulse: 87   Resp: 16   Temp: 97.1 °F (36.2 °C)   TempSrc: Temporal   SpO2: 99%   Weight: 180 lb (81.6 kg)   Height: 5' 1\" (1.549 m)     She received Toradol 30 mg IM and Dilaudid 0.5 mg IM. She reported improvement. I discussed with her the diagnosis and treatment plan. She demonstrated understanding. FINAL IMPRESSION      1. Radicular pain of left lower extremity    2. Sciatica, left side          DISPOSITION/PLAN   Discharged home in good condition.     PATIENT REFERRED TO:  Kalyani Butler MD  [address] Elicia Linton [phone]    In 3 days        DISCHARGE MEDICATIONS:  New Prescriptions    No medications on file       (Please note that portions of this note were completed with a voice recognition program.  Efforts were made to edit the dictations but occasionally words are mis-transcribed.)    MD Leandro Clay MD  05/27/22 203

## 2022-05-28 NOTE — ED NOTES
Patient in stable condition. Alert and oriented x3. Unlabored breathing present. Patient aware of plan of care. Patient discharge instructions given and explained. Follow up information instructions given. Patient agreeable to plan of care. Patient states understanding and denies any questions or concerns. Patient ambulated out of ER with no complications.         Юлия Chaidez RN  05/27/22 2031

## 2022-08-10 NOTE — ED NOTES
Pt presents to the front window with complaints of boil to her right breast. Noticed the boil yesterday when she showered. Redness noted to the outer, underside of her right breast. Reddened area approximately the size of a pea. No pus noted.       Cathy Tavares RN  03/24/20 8484 No

## 2024-03-09 ENCOUNTER — HOSPITAL ENCOUNTER (EMERGENCY)
Age: 54
Discharge: HOME | End: 2024-03-09
Attending: FAMILY MEDICINE
Payer: COMMERCIAL

## 2024-03-09 VITALS
DIASTOLIC BLOOD PRESSURE: 69 MMHG | WEIGHT: 180 LBS | OXYGEN SATURATION: 100 % | HEART RATE: 70 BPM | TEMPERATURE: 98.1 F | RESPIRATION RATE: 16 BRPM | SYSTOLIC BLOOD PRESSURE: 113 MMHG | BODY MASS INDEX: 34.01 KG/M2

## 2024-03-09 DIAGNOSIS — R11.2 NAUSEA AND VOMITING, UNSPECIFIED VOMITING TYPE: ICD-10-CM

## 2024-03-09 DIAGNOSIS — J10.1 INFLUENZA A: Primary | ICD-10-CM

## 2024-03-09 LAB
ALBUMIN SERPL BCP-MCNC: 3.4 GM/DL (ref 3.4–5)
ALP SERPL-CCNC: 58 U/L (ref 46–116)
ALT SERPL W P-5'-P-CCNC: 36 U/L (ref 14–63)
AMORPH SED URNS QL MICRO: NORMAL
ANION GAP SERPL CALC-SCNC: 8 MEQ/L (ref 8–16)
AST SERPL W P-5'-P-CCNC: 34 U/L (ref 15–37)
BACTERIA URNS QL MICRO: NORMAL
BASOPHILS # BLD: 0.2 % (ref 0–3)
BASOPHILS ABSOLUTE: 0 THOU/MM3 (ref 0–0.1)
BILIRUB SERPL-MCNC: 0.3 MG/DL (ref 0.2–1)
BILIRUB UR QL STRIP.AUTO: NEGATIVE
BUN SERPL-MCNC: 8 MG/DL (ref 7–18)
CALCIUM SERPL-MCNC: 8.2 MG/DL (ref 8.5–10.1)
CASTS #/AREA URNS LPF: NORMAL /LPF
CHARACTER UR: CLEAR
CHLORIDE SERPL-SCNC: 105 MEQ/L (ref 98–107)
CO2 SERPL-SCNC: 27 MEQ/L (ref 21–32)
COLOR UR AUTO: YELLOW
CREAT SERPL-MCNC: 0.9 MG/DL (ref 0.6–1.3)
CRYSTALS VITF MICRO: NORMAL
EOSINOPHILS ABSOLUTE: 0.1 THOU/MM3 (ref 0–0.5)
EOSINOPHILS RELATIVE PERCENT: 1 % (ref 0–4)
EPI CELLS #/AREA URNS HPF: NORMAL /HPF
GFR SERPL CREATININE-BSD FRML MDRD: > 60 ML/MIN/1.73M2
GLUCOSE SERPL-MCNC: 154 MG/DL (ref 74–106)
GLUCOSE UR QL STRIP.AUTO: NEGATIVE MG/DL
HCT VFR BLD CALC: 39.7 % (ref 37–47)
HEMOGLOBIN: 14 GM/DL (ref 12–16)
HGB UR STRIP.AUTO-MCNC: NEGATIVE MG/L
IMMATURE GRANS (ABS): 0 THOU/MM3 (ref 0–0.07)
IMMATURE GRANULOCYTES: 0 %
INFLUENZA A BY PCR: DETECTED
INFLUENZA B BY PCR: NOT DETECTED
KETONES UR QL STRIP.AUTO: NEGATIVE
LEUKOCYTE ESTERASE UR QL STRIP.AUTO: NEGATIVE
LIPASE SERPL-CCNC: 80 U/L (ref 16–77)
LYMPHOCYTES # BLD AUTO: 31.2 % (ref 15–47)
LYMPHOCYTES ABSOLUTE: 1.6 THOU/MM3 (ref 1–4.8)
MAGNESIUM SERPL-MCNC: 1.7 MG/DL (ref 1.8–2.4)
MCH RBC QN AUTO: 30.2 PG (ref 26–32)
MCHC RBC AUTO-ENTMCNC: 35.3 GM/DL (ref 31–35)
MCV RBC AUTO: 85.7 FL (ref 81–99)
MONOCYTES: 0.3 THOU/MM3 (ref 0.3–1.3)
MONOCYTES: 6.6 % (ref 0–12)
MUCOUS THREADS URNS QL MICRO: NORMAL
NITRITE UR QL STRIP.AUTO: NEGATIVE
PDW BLD-RTO: 12.4 % (ref 11.5–14.9)
PH UR STRIP.AUTO: 6.5 [PH] (ref 5–9)
PLATELET # BLD AUTO: 154 THOU/MM3 (ref 130–400)
PMV BLD AUTO: 9.4 FL (ref 9.4–12.4)
POTASSIUM SERPL-SCNC: 3.4 MEQ/L (ref 3.5–5.1)
PROT SERPL-MCNC: 7.3 GM/DL (ref 6.4–8.2)
PROT UR STRIP.AUTO-MCNC: NEGATIVE MG/DL
RBC # BLD: 4.63 MILL/MM3 (ref 4.1–5.3)
RBC #/AREA URNS HPF: NORMAL /HPF
REFLEX TO URINE C & S: NORMAL
SARS-COV-2 RNA, RT PCR: NOT DETECTED
SEG NEUTROPHILS: 60.8 % (ref 43–75)
SEGMENTED NEUTROPHILS ABSOLUTE COUNT: 3 THOU/MM3 (ref 1.8–7.7)
SODIUM SERPL-SCNC: 140 MEQ/L (ref 136–145)
SP GR UR STRIP.AUTO: 1.01 (ref 1–1.03)
UROBILINOGEN UR STRIP-ACNC: 1 EU/DL (ref 0–1)
WBC # BLD: 5 THOU/MM3 (ref 4.8–10.8)
WBC # UR STRIP.AUTO: NORMAL /HPF

## 2024-03-09 PROCEDURE — 99284 EMERGENCY DEPT VISIT MOD MDM: CPT

## 2024-03-09 PROCEDURE — 6360000002 HC RX W HCPCS

## 2024-03-09 PROCEDURE — 81001 URINALYSIS AUTO W/SCOPE: CPT

## 2024-03-09 PROCEDURE — 83690 ASSAY OF LIPASE: CPT

## 2024-03-09 PROCEDURE — 83735 ASSAY OF MAGNESIUM: CPT

## 2024-03-09 PROCEDURE — 87636 SARSCOV2 & INF A&B AMP PRB: CPT

## 2024-03-09 PROCEDURE — 96361 HYDRATE IV INFUSION ADD-ON: CPT

## 2024-03-09 PROCEDURE — 2580000003 HC RX 258: Performed by: FAMILY MEDICINE

## 2024-03-09 PROCEDURE — 96374 THER/PROPH/DIAG INJ IV PUSH: CPT

## 2024-03-09 PROCEDURE — 85025 COMPLETE CBC W/AUTO DIFF WBC: CPT

## 2024-03-09 PROCEDURE — 80053 COMPREHEN METABOLIC PANEL: CPT

## 2024-03-09 RX ORDER — 0.9 % SODIUM CHLORIDE 0.9 %
1000 INTRAVENOUS SOLUTION INTRAVENOUS ONCE
Status: COMPLETED | OUTPATIENT
Start: 2024-03-09 | End: 2024-03-09

## 2024-03-09 RX ORDER — ONDANSETRON 2 MG/ML
4 INJECTION INTRAMUSCULAR; INTRAVENOUS ONCE
Status: COMPLETED | OUTPATIENT
Start: 2024-03-09 | End: 2024-03-09

## 2024-03-09 RX ORDER — ONDANSETRON 4 MG/1
4 TABLET, ORALLY DISINTEGRATING ORAL 3 TIMES DAILY PRN
Qty: 21 TABLET | Refills: 0 | Status: SHIPPED | OUTPATIENT
Start: 2024-03-09

## 2024-03-09 RX ORDER — ONDANSETRON 2 MG/ML
INJECTION INTRAMUSCULAR; INTRAVENOUS
Status: COMPLETED
Start: 2024-03-09 | End: 2024-03-09

## 2024-03-09 RX ADMIN — ONDANSETRON 4 MG: 2 INJECTION INTRAMUSCULAR; INTRAVENOUS at 15:00

## 2024-03-09 RX ADMIN — SODIUM CHLORIDE 1000 ML: 9 INJECTION, SOLUTION INTRAVENOUS at 15:03

## 2024-03-09 ASSESSMENT — ENCOUNTER SYMPTOMS
ABDOMINAL PAIN: 0
NAUSEA: 1
SORE THROAT: 0
DIARRHEA: 0
VOMITING: 1
COUGH: 1
WHEEZING: 0

## 2024-03-09 ASSESSMENT — PAIN - FUNCTIONAL ASSESSMENT
PAIN_FUNCTIONAL_ASSESSMENT: NONE - DENIES PAIN

## 2024-03-09 ASSESSMENT — PAIN SCALES - GENERAL
PAINLEVEL_OUTOF10: 0

## 2024-03-09 ASSESSMENT — LIFESTYLE VARIABLES: HOW OFTEN DO YOU HAVE A DRINK CONTAINING ALCOHOL: NEVER

## 2024-03-09 NOTE — DISCHARGE INSTR - COC
Continuity of Care Form    Patient Name: Lewis Gregory   :  1970  MRN:  [id number]    Admit date:  3/9/2024  Discharge date:  ***    Code Status Order: Prior   Advance Directives:     Admitting Physician:  No admitting provider for patient encounter.  PCP: Leopold, Katelyn Ann, MD    Discharging Nurse: ***  Discharging Hospital Unit/Room#: E5/E5  Discharging Unit Phone Number: ***    Emergency Contact:   Extended Emergency Contact Information  Primary Emergency Contact: Jenna Gregory  Address: [address] [place] [id number] [place]  Home Phone: [phone]  Mobile Phone: [phone]  Relation: Child   needed? No    Past Surgical History:  Past Surgical History:   Procedure Laterality Date    FOOT SURGERY Left     KNEE SURGERY Right        Immunization History:   Immunization History   Administered Date(s) Administered    TDaP, ADACEL (age 10y-64y), BOOSTRIX (age 10y+), IM, 0.5mL 2013, 2018, 2018       Active Problems:  Patient Active Problem List   Diagnosis Code    Suicidal ideation R45.851    Depressive disorder, not elsewhere classified F32.89    Alcohol blood level excessive R78.0    Chest pain R07.9       Isolation/Infection:   Isolation            No Isolation          Patient Infection Status       None to display                     Nurse Assessment:  Last Vital Signs: /69   Pulse 70   Temp 98.1 °F (36.7 °C) (Tympanic)   Resp 16   Wt 81.6 kg (180 lb)   LMP  (Exact Date)   SpO2 100%   BMI 34.01 kg/m²     Last documented pain score (0-10 scale): Pain Level: 0  Last Weight:   Wt Readings from Last 1 Encounters:   24 81.6 kg (180 lb)     Mental Status:  {IP PT MENTAL STATUS:96979}    IV Access:  {[facility] IV ACCESS:303565692}    Nursing Mobility/ADLs:  Walking   {CHP DME ADLs:030779029}  Transfer  {CHP DME ADLs:929048988}  Bathing  {CHP DME ADLs:805328566}  Dressing  {CHP DME ADLs:436064666}  Toileting  {CHP DME  SECTION    Prognosis: {Prognosis:1366513672}    Condition at Discharge: { Patient Condition:822798046}    Rehab Potential (if transferring to Rehab): {Prognosis:4280632354}    Recommended Labs or Other Treatments After Discharge: ***    Physician Certification: I certify the above information and transfer of Lewis Gregory  is necessary for the continuing treatment of the diagnosis listed and that she requires {Admit to Appropriate Level of Care:11127} for {GREATER/LESS:132454385} 30 days.     Update Admission H&P: {CHP DME Changes in HandP:732877783}    PHYSICIAN SIGNATURE:  {Esignature:966374928}

## 2024-03-09 NOTE — ED PROVIDER NOTES
[facility]  eMERGENCY dEPARTMENT eNCOUnter          CHIEF COMPLAINT       Chief Complaint   Patient presents with    Emesis     X 3 days       Nurses Notes reviewed and I agree except as noted in the HPI.      HISTORY OF PRESENT ILLNESS    Lewis Gregory is a 53 y.o. female who presents with vomiting for 3 days.  Patient notes no abdominal discomfort.  Denies any fever or chills.  Has noted some upper respiratory symptoms like cough and congestion.  Denies any urinary symptoms at this time        REVIEW OF SYSTEMS     Review of Systems   Constitutional:  Negative for chills and fever.   HENT:  Positive for congestion. Negative for sore throat.    Respiratory:  Positive for cough. Negative for wheezing.    Gastrointestinal:  Positive for nausea and vomiting. Negative for abdominal pain and diarrhea.   Musculoskeletal:  Positive for myalgias. Negative for arthralgias.   All other systems reviewed and are negative.        PAST MEDICAL HISTORY    has a past medical history of Depressive disorder, not elsewhere classified.    SURGICAL HISTORY      has a past surgical history that includes knee surgery (Right) and Foot surgery (Left).    CURRENT MEDICATIONS       Previous Medications    ACETAMINOPHEN (TYLENOL) 325 MG TABLET    Take 650 mg by mouth every 6 hours as needed for Pain    ASPIRIN 81 MG CHEWABLE TABLET    Take 1 tablet by mouth daily    ATORVASTATIN (LIPITOR) 40 MG TABLET    Take 1 tablet by mouth nightly    SERTRALINE (ZOLOFT) 100 MG TABLET           ALLERGIES     has No Known Allergies.    FAMILY HISTORY     She indicated that the status of her father is unknown.   family history includes Cancer in her father.    SOCIAL HISTORY      reports that she has been smoking cigarettes. She has never used smokeless tobacco. She reports that she does not drink alcohol and does not use drugs.    PHYSICAL EXAM     INITIAL VITALS:  weight is 81.6 kg (180 lb). Her tympanic temperature is 98.1 °F (36.7 °C).  80.0 (*)     All other components within normal limits   MAGNESIUM - Abnormal; Notable for the following components:    Magnesium 1.7 (*)     All other components within normal limits   COVID-19 & INFLUENZA COMBO   URINALYSIS WITH REFLEX TO CULTURE   GLOMERULAR FILTRATION RATE, ESTIMATED   ANION GAP       EMERGENCY DEPARTMENT COURSE:   Vitals:    Vitals:    03/09/24 1425 03/09/24 1522 03/09/24 1554   BP: 120/72  113/69   Pulse: 82  70   Resp: 16 16 16   Temp: 97.3 °F (36.3 °C)  98.1 °F (36.7 °C)   TempSrc: Tympanic  Tympanic   SpO2: 94% 97% 100%   Weight: 81.6 kg (180 lb)       Vital signs are stable.  Patient noted 3 days of vomiting.  Labs were obtained potassium was 3.4.  She was given a normal saline bolus during her course of care Zofran was provided.  Her nausea improved.  She was able to tolerate oral fluids.  She did test positive for influenza during her course.  I will go ahead and provide her Zofran for her nausea and vomiting I encouraged her to use fluids at home.  Work note was provided.  Care instructions were provided.  Urinalysis was otherwise unremarkable      PROCEDURES:  None    FINAL IMPRESSION      1. Influenza A    2. Nausea and vomiting, unspecified vomiting type          DISPOSITION/PLAN   Home      PATIENT REFERRED TO:  Leopold, Katelyn Ann, MD  [address] [place] [address] [phone]    Call in 2 days  If symptoms worsen, As needed      DISCHARGE MEDICATIONS:  New Prescriptions    ONDANSETRON (ZOFRAN-ODT) 4 MG DISINTEGRATING TABLET    Take 1 tablet by mouth 3 times daily as needed for Nausea or Vomiting       (Please note that portions of this note were completed with a voice recognition program.  Efforts were made to edit the dictations but occasionally words are mis-transcribed.)    MD Rasta Don Edward R, MD  03/09/24 2400

## 2024-03-09 NOTE — ED TRIAGE NOTES
Per patient, \"vomiting x 3 days. No abdominal pain. Last BM 3 days ago.\" Observed patient appears uncomfortable, resp easy, warm and dry. Placed in bed, warm blanket provided.

## 2024-04-10 ENCOUNTER — APPOINTMENT (OUTPATIENT)
Dept: GENERAL RADIOLOGY | Age: 54
End: 2024-04-10
Payer: OTHER MISCELLANEOUS

## 2024-04-10 ENCOUNTER — HOSPITAL ENCOUNTER (EMERGENCY)
Age: 54
Discharge: HOME | End: 2024-04-10
Attending: FAMILY MEDICINE
Payer: OTHER MISCELLANEOUS

## 2024-04-10 VITALS
BODY MASS INDEX: 34.39 KG/M2 | WEIGHT: 182 LBS | HEART RATE: 77 BPM | SYSTOLIC BLOOD PRESSURE: 139 MMHG | DIASTOLIC BLOOD PRESSURE: 78 MMHG | OXYGEN SATURATION: 100 % | RESPIRATION RATE: 16 BRPM | TEMPERATURE: 98.2 F
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
## 2024-04-10 DIAGNOSIS — S20.212A CONTUSION OF LEFT CHEST WALL, INITIAL ENCOUNTER: ICD-10-CM

## 2024-04-10 DIAGNOSIS — S80.12XA CONTUSION OF LEFT LOWER LEG, INITIAL ENCOUNTER: ICD-10-CM

## 2024-04-10 LAB
ALBUMIN SERPL BCP-MCNC: 3.5 GM/DL (ref 3.4–5)
ALP SERPL-CCNC: 62 U/L (ref 46–116)
ALT SERPL W P-5'-P-CCNC: 30 U/L (ref 14–63)
ANALYZED BY:: NORMAL
ANION GAP SERPL CALC-SCNC: 10 MEQ/L (ref 8–16)
AST SERPL W P-5'-P-CCNC: 24 U/L (ref 15–37)
BASOPHILS # BLD: 0.1 % (ref 0–3)
BASOPHILS ABSOLUTE: 0 THOU/MM3 (ref 0–0.1)
BILIRUB SERPL-MCNC: 0.4 MG/DL (ref 0.2–1)
BUN SERPL-MCNC: 9 MG/DL (ref 7–18)
CALCIUM SERPL-MCNC: 8.8 MG/DL (ref 8.5–10.1)
CHLORIDE SERPL-SCNC: 101 MEQ/L (ref 98–107)
CO2 SERPL-SCNC: 27 MEQ/L (ref 21–32)
CREAT SERPL-MCNC: 0.9 MG/DL (ref 0.6–1.3)
DATE OF COLLECTION: NORMAL
DRAWN BY: NORMAL
EOSINOPHILS ABSOLUTE: 0.2 THOU/MM3 (ref 0–0.5)
EOSINOPHILS RELATIVE PERCENT: 2.6 % (ref 0–4)
ETHANOL SERPL-MCNC: < 0.01 % (GM/DL)
GFR SERPL CREATININE-BSD FRML MDRD: 76 ML/MIN/1.73M2
GLUCOSE SERPL-MCNC: 100 MG/DL (ref 74–106)
HCT VFR BLD CALC: 40.4 % (ref 37–47)
HEMOGLOBIN: 14 GM/DL (ref 12–16)
IMMATURE GRANS (ABS): 0 THOU/MM3 (ref 0–0.07)
IMMATURE GRANULOCYTES %: 0 %
LYMPHOCYTES # BLD AUTO: 26.1 % (ref 15–47)
LYMPHOCYTES ABSOLUTE: 2 THOU/MM3 (ref 1–4.8)
Lab: 2230
Lab: NORMAL
MCH RBC QN AUTO: 30.2 PG (ref 26–32)
MCHC RBC AUTO-ENTMCNC: 34.7 GM/DL (ref 31–35)
MCV RBC AUTO: 87.1 FL (ref 81–99)
MONOCYTES: 0.4 THOU/MM3 (ref 0.3–1.3)
MONOCYTES: 5 % (ref 0–12)
PDW BLD-RTO: 12.9 % (ref 11.5–14.9)
PLATELET # BLD AUTO: 196 THOU/MM3 (ref 130–400)
PMV BLD AUTO: 8.6 FL (ref 9.4–12.4)
POTASSIUM SERPL-SCNC: 3.4 MEQ/L (ref 3.5–5.1)
PROT SERPL-MCNC: 7.5 GM/DL (ref 6.4–8.2)
RBC # BLD: 4.64 MILL/MM3 (ref 4.1–5.3)
SEG NEUTROPHILS: 65.9 % (ref 43–75)
SEGMENTED NEUTROPHILS ABSOLUTE COUNT: 5.1 THOU/MM3 (ref 1.8–7.7)
SODIUM SERPL-SCNC: 138 MEQ/L (ref 136–145)
WBC # BLD: 7.8 THOU/MM3 (ref 4.8–10.8)

## 2024-04-10 PROCEDURE — 99284 EMERGENCY DEPT VISIT MOD MDM: CPT

## 2024-04-10 PROCEDURE — 80053 COMPREHEN METABOLIC PANEL: CPT

## 2024-04-10 PROCEDURE — 72170 X-RAY EXAM OF PELVIS: CPT

## 2024-04-10 PROCEDURE — 71046 X-RAY EXAM CHEST 2 VIEWS: CPT

## 2024-04-10 PROCEDURE — 85025 COMPLETE CBC W/AUTO DIFF WBC: CPT

## 2024-04-10 PROCEDURE — 36415 COLL VENOUS BLD VENIPUNCTURE: CPT

## 2024-04-10 PROCEDURE — 73590 X-RAY EXAM OF LOWER LEG: CPT

## 2024-04-10 PROCEDURE — 82077 ASSAY SPEC XCP UR&BREATH IA: CPT

## 2024-04-10 PROCEDURE — 73030 X-RAY EXAM OF SHOULDER: CPT

## 2024-04-10 ASSESSMENT — ENCOUNTER SYMPTOMS
COUGH: 0
VOMITING: 0
SHORTNESS OF BREATH: 0
NAUSEA: 0
ABDOMINAL PAIN: 0

## 2024-04-10 ASSESSMENT — PAIN DESCRIPTION - LOCATION: LOCATION: SHOULDER;LEG

## 2024-04-10 ASSESSMENT — LIFESTYLE VARIABLES
HOW OFTEN DO YOU HAVE A DRINK CONTAINING ALCOHOL: NEVER
HOW MANY STANDARD DRINKS CONTAINING ALCOHOL DO YOU HAVE ON A TYPICAL DAY: PATIENT DOES NOT DRINK

## 2024-04-10 ASSESSMENT — PAIN DESCRIPTION - ORIENTATION: ORIENTATION: LEFT

## 2024-04-10 ASSESSMENT — PAIN SCALES - GENERAL: PAINLEVEL_OUTOF10: 10

## 2024-04-11 NOTE — DISCHARGE INSTR - COC
ADLs:706021851}  Feeding  {CHP DME ADLs:333525156}  Med Admin  {CHP DME ADLs:525524336}  Med Delivery   { JULIANA MED Delivery:623276011}    Wound Care Documentation and Therapy:        Elimination:  Continence:   Bowel: {YES / NO:}  Bladder: {YES / NO:}  Urinary Catheter: {Urinary Catheter:123006863}   Colostomy/Ileostomy/Ileal Conduit: {YES / NO:}       Date of Last BM: ***  No intake or output data in the 24 hours ending 04/10/24 2329  No intake/output data recorded.    Safety Concerns:     { JULIANA Safety Concerns:899585143}    Impairments/Disabilities:      { JULIANA Impairments/Disabilities:984724474}    Nutrition Therapy:  Current Nutrition Therapy:   { JULIANA Diet List:110758419}    Routes of Feeding: {CHP DME Other Feedings:459135549}  Liquids: {Slp liquid thickness:33275}  Daily Fluid Restriction: {CHP DME Yes amt example:661959768}  Last Modified Barium Swallow with Video (Video Swallowing Test): {Done Not Done Date:786586124}    Treatments at the Time of Hospital Discharge:   Respiratory Treatments: ***  Oxygen Therapy:  {Therapy; copd oxygen:93136}  Ventilator:    {[facility] Vent List:358002517}    Rehab Therapies: {THERAPEUTIC INTERVENTION:7155367534}  Weight Bearing Status/Restrictions: {[facility] Weight Bearin}  Other Medical Equipment (for information only, NOT a DME order):  {EQUIPMENT:981311254}  Other Treatments: ***    Patient's personal belongings (please select all that are sent with patient):  {[facility] DME Belongings:570796667}    RN SIGNATURE:  {Esignature:618095931}    CASE MANAGEMENT/SOCIAL WORK SECTION    Inpatient Status Date: ***    Readmission Risk Assessment Score:  Readmission Risk              Risk of Unplanned Readmission:  0           Discharging to Facility/ Agency   Name:   Address:  Phone:  Fax:    Dialysis Facility (if applicable)   Name:  Address:  Dialysis Schedule:  Phone:  Fax:    / signature: {Esignature:715733831}    PHYSICIAN

## 2024-04-11 NOTE — ED NOTES
Pt notified family is here and they went in to her daughters room. States at this time she does not want them in room. Denies needs at this time. Respirations easy and unlabored. Registration to room.  Updated pt on plan of care. Call light in reach. Will continue to monitor.

## 2024-04-11 NOTE — ED NOTES
Fast exam at bedside by Dr Magdaleno complete with Rosemarie Leonard present. Pt tolerated well. Officer Herbert at bedside.  Pt remains alert and oriented. Calm and cooperative. Respirations easy and unlabored

## 2024-04-11 NOTE — DISCHARGE INSTRUCTIONS
Ice to affected area. Tylenol or motrin for pain. Follow up with PCP for re evaluation. Return if worsening pain.
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
## 2024-04-11 NOTE — ED NOTES
Discharge teaching and instructions for condition explained to patient. AVS reviewed. Went over prescriptions with patient. Patient voiced understanding regarding prescriptions, follow up appointments and care of self at home. Pt discharged to home in stable condition with family

## 2025-03-28 ENCOUNTER — HOSPITAL ENCOUNTER (EMERGENCY)
Age: 55
Discharge: HOME | End: 2025-03-28
Attending: EMERGENCY MEDICINE
Payer: COMMERCIAL

## 2025-03-28 ENCOUNTER — APPOINTMENT (OUTPATIENT)
Dept: GENERAL RADIOLOGY | Age: 55
End: 2025-03-28
Payer: COMMERCIAL

## 2025-03-28 VITALS
TEMPERATURE: 98.1 F | HEART RATE: 90 BPM | RESPIRATION RATE: 20 BRPM | HEIGHT: 61 IN | DIASTOLIC BLOOD PRESSURE: 75 MMHG | BODY MASS INDEX: 34.93 KG/M2 | OXYGEN SATURATION: 96 % | SYSTOLIC BLOOD PRESSURE: 136 MMHG | WEIGHT: 185 LBS

## 2025-03-28 DIAGNOSIS — M72.2 PLANTAR FASCIITIS OF RIGHT FOOT: ICD-10-CM

## 2025-03-28 DIAGNOSIS — M79.671 RIGHT FOOT PAIN: Primary | ICD-10-CM

## 2025-03-28 PROCEDURE — 73630 X-RAY EXAM OF FOOT: CPT

## 2025-03-28 PROCEDURE — 99283 EMERGENCY DEPT VISIT LOW MDM: CPT

## 2025-03-28 ASSESSMENT — PAIN SCALES - GENERAL: PAINLEVEL_OUTOF10: 5

## 2025-03-28 ASSESSMENT — PAIN DESCRIPTION - LOCATION: LOCATION: FOOT

## 2025-03-28 ASSESSMENT — PAIN DESCRIPTION - DESCRIPTORS: DESCRIPTORS: ACHING

## 2025-03-28 ASSESSMENT — PAIN DESCRIPTION - ORIENTATION: ORIENTATION: RIGHT

## 2025-03-28 ASSESSMENT — PAIN - FUNCTIONAL ASSESSMENT: PAIN_FUNCTIONAL_ASSESSMENT: 0-10

## 2025-03-28 NOTE — ED NOTES
Pt stable and off to Radiology via ED cart with [employer] tech. Pt states no concerns and vitals stable.

## 2025-03-28 NOTE — DISCHARGE INSTRUCTIONS
Use Tylenol or Motrin for pain.  Do plantar fasciitis exercises to help strengthen the bottom of your foot.  Avoid prolonged standing or walking.  Follow-up with primary care.  You may also call Dr. Lopez who is a foot specialist to have thorough exam.  You can also call whoever did your prior foot surgery on your left side.

## 2025-03-28 NOTE — ED TRIAGE NOTES
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 Normal PMS ROM but pain when walking.

## 2025-03-28 NOTE — ED PROVIDER NOTES
[facility] Emergency Department  [address]  [facility] [address]  Phone: [phone]  EMERGENCY DEPARTMENT ENCOUNTER      Pt Name: Lewis Gregory  MRN: [id number]  Birthdate 1970  Date of evaluation: 3/28/2025  Provider: Winston Villavicencio MD    CHIEF COMPLAINT       Foot pain        HISTORY OF PRESENT ILLNESS      Lewis Gregory is a 54 y.o. female who presents to the emergency department with above-noted complaint.  Patient has right foot pain and discomfort.  Been going on for few days.  She is on her feet a lot.  Points to plantar area.  Denies other injury or trauma.  No weakness        REVIEW OF SYSTEMS     Positive for foot pain.  No other trauma  Review of Systems  All systems negative except as marked.     PAST MEDICAL HISTORY     Past Medical History:   Diagnosis Date    Depressive disorder, not elsewhere classified 4/22/2013         SURGICAL HISTORY       Past Surgical History:   Procedure Laterality Date    FOOT SURGERY Left     KNEE SURGERY Right          CURRENT MEDICATIONS       Discharge Medication List as of 3/28/2025  8:05 PM        CONTINUE these medications which have NOT CHANGED    Details   ondansetron (ZOFRAN-ODT) 4 MG disintegrating tablet Take 1 tablet by mouth 3 times daily as needed for Nausea or Vomiting, Disp-21 tablet, R-0Normal      atorvastatin (LIPITOR) 40 MG tablet Take 1 tablet by mouth nightly, Disp-30 tablet, R-0Print      aspirin 81 MG chewable tablet Take 1 tablet by mouth daily, Disp-30 tablet, R-0Print      acetaminophen (TYLENOL) 325 MG tablet Take 650 mg by mouth every 6 hours as needed for PainHistorical Med      sertraline (ZOLOFT) 100 MG tablet Historical Med             ALLERGIES       Patient has no known allergies.    FAMILY HISTORY       Family History   Problem Relation Age of Onset    Cancer Father           SOCIAL HISTORY       Social History     Tobacco Use    Smoking status: Every Day     Current packs/day: 1.00     Types: Cigarettes